# Patient Record
Sex: MALE | Race: WHITE | Employment: FULL TIME | URBAN - METROPOLITAN AREA
[De-identification: names, ages, dates, MRNs, and addresses within clinical notes are randomized per-mention and may not be internally consistent; named-entity substitution may affect disease eponyms.]

---

## 2020-02-06 ENCOUNTER — APPOINTMENT (OUTPATIENT)
Dept: LAB | Age: 25
End: 2020-02-06
Attending: FAMILY MEDICINE
Payer: COMMERCIAL

## 2020-02-06 ENCOUNTER — MED REC SCAN ONLY (OUTPATIENT)
Dept: FAMILY MEDICINE CLINIC | Facility: CLINIC | Age: 25
End: 2020-02-06

## 2020-02-06 ENCOUNTER — OFFICE VISIT (OUTPATIENT)
Dept: FAMILY MEDICINE CLINIC | Facility: CLINIC | Age: 25
End: 2020-02-06
Payer: COMMERCIAL

## 2020-02-06 VITALS
HEART RATE: 86 BPM | HEIGHT: 70.5 IN | WEIGHT: 181 LBS | RESPIRATION RATE: 16 BRPM | DIASTOLIC BLOOD PRESSURE: 74 MMHG | SYSTOLIC BLOOD PRESSURE: 120 MMHG | BODY MASS INDEX: 25.62 KG/M2 | TEMPERATURE: 98 F

## 2020-02-06 DIAGNOSIS — R79.89 TSH ELEVATION: ICD-10-CM

## 2020-02-06 DIAGNOSIS — R14.0 ABDOMINAL BLOATING: ICD-10-CM

## 2020-02-06 DIAGNOSIS — E22.1 HYPERPROLACTINEMIA (HCC): Primary | ICD-10-CM

## 2020-02-06 DIAGNOSIS — N52.9 ERECTILE DYSFUNCTION, UNSPECIFIED ERECTILE DYSFUNCTION TYPE: ICD-10-CM

## 2020-02-06 DIAGNOSIS — E22.1 HYPERPROLACTINEMIA (HCC): ICD-10-CM

## 2020-02-06 LAB
ALBUMIN SERPL-MCNC: 4 G/DL (ref 3.4–5)
ALBUMIN/GLOB SERPL: 1 {RATIO} (ref 1–2)
ALP LIVER SERPL-CCNC: 70 U/L (ref 45–117)
ALT SERPL-CCNC: 48 U/L (ref 16–61)
ANION GAP SERPL CALC-SCNC: 2 MMOL/L (ref 0–18)
AST SERPL-CCNC: 24 U/L (ref 15–37)
BILIRUB SERPL-MCNC: 1.6 MG/DL (ref 0.1–2)
BUN BLD-MCNC: 17 MG/DL (ref 7–18)
BUN/CREAT SERPL: 17.2 (ref 10–20)
CALCIUM BLD-MCNC: 9.4 MG/DL (ref 8.5–10.1)
CHLORIDE SERPL-SCNC: 105 MMOL/L (ref 98–112)
CO2 SERPL-SCNC: 30 MMOL/L (ref 21–32)
CREAT BLD-MCNC: 0.99 MG/DL (ref 0.7–1.3)
ESTRADIOL SERPL-MCNC: 28.7 PG/ML (ref 11–52.5)
GLOBULIN PLAS-MCNC: 4.2 G/DL (ref 2.8–4.4)
GLUCOSE BLD-MCNC: 82 MG/DL (ref 70–99)
LH SERPL-ACNC: 4 MIU/ML (ref 1.2–10.6)
M PROTEIN MFR SERPL ELPH: 8.2 G/DL (ref 6.4–8.2)
OSMOLALITY SERPL CALC.SUM OF ELEC: 285 MOSM/KG (ref 275–295)
PATIENT FASTING Y/N/NP: YES
POTASSIUM SERPL-SCNC: 4.1 MMOL/L (ref 3.5–5.1)
PROLACTIN SERPL-MCNC: 17.3 NG/ML (ref 2.5–17.4)
SODIUM SERPL-SCNC: 137 MMOL/L (ref 136–145)
T4 FREE SERPL-MCNC: 1.2 NG/DL (ref 0.8–1.7)
TESTOST SERPL-MCNC: 670.18 NG/DL
THYROGLOB SERPL-MCNC: 17 U/ML (ref ?–60)
THYROPEROXIDASE AB SERPL-ACNC: <28 U/ML (ref ?–60)
TSI SER-ACNC: 2.73 MIU/ML (ref 0.36–3.74)

## 2020-02-06 PROCEDURE — 82785 ASSAY OF IGE: CPT | Performed by: FAMILY MEDICINE

## 2020-02-06 PROCEDURE — 80053 COMPREHEN METABOLIC PANEL: CPT | Performed by: FAMILY MEDICINE

## 2020-02-06 PROCEDURE — 82670 ASSAY OF TOTAL ESTRADIOL: CPT | Performed by: FAMILY MEDICINE

## 2020-02-06 PROCEDURE — 83002 ASSAY OF GONADOTROPIN (LH): CPT | Performed by: FAMILY MEDICINE

## 2020-02-06 PROCEDURE — 86003 ALLG SPEC IGE CRUDE XTRC EA: CPT | Performed by: FAMILY MEDICINE

## 2020-02-06 PROCEDURE — 84439 ASSAY OF FREE THYROXINE: CPT | Performed by: FAMILY MEDICINE

## 2020-02-06 PROCEDURE — 84403 ASSAY OF TOTAL TESTOSTERONE: CPT | Performed by: FAMILY MEDICINE

## 2020-02-06 PROCEDURE — 84146 ASSAY OF PROLACTIN: CPT | Performed by: FAMILY MEDICINE

## 2020-02-06 PROCEDURE — 86376 MICROSOMAL ANTIBODY EACH: CPT | Performed by: FAMILY MEDICINE

## 2020-02-06 PROCEDURE — 99203 OFFICE O/P NEW LOW 30 MIN: CPT | Performed by: FAMILY MEDICINE

## 2020-02-06 PROCEDURE — 36415 COLL VENOUS BLD VENIPUNCTURE: CPT | Performed by: FAMILY MEDICINE

## 2020-02-06 PROCEDURE — 86800 THYROGLOBULIN ANTIBODY: CPT | Performed by: FAMILY MEDICINE

## 2020-02-06 PROCEDURE — 84443 ASSAY THYROID STIM HORMONE: CPT | Performed by: FAMILY MEDICINE

## 2020-02-06 RX ORDER — SILDENAFIL CITRATE 20 MG/1
20 TABLET ORAL 3 TIMES DAILY
COMMUNITY

## 2020-02-06 NOTE — PROGRESS NOTES
[address] [facility] Office Note  Chief Complaint:   Patient presents with:  Consult: discuss prolactin       HPI:   This is a 30year old male coming in for elevated prolactin levels and abdominal bloating.     1.  Elevated prolactin levels Take 20 mg by mouth 3 (three) times daily. • triamcinolone acetonide 0.1 % External Cream   1      Counseling given: Not Answered       REVIEW OF SYSTEMS:   ROS:  CONSTITUTIONAL:  Denies any unusual weight gain/loss, fever, chills, weakness or fatigue. prolactinoma  - COMP METABOLIC PANEL (14); Future  - PROLACTIN; Future  - LH (LUTEINIZING HORMONE); Future  - ESTRADIOL; Future    2.  Erectile dysfunction, unspecified erectile dysfunction type  -  Could be related to high prolactin levels  -  R/o low test

## 2020-02-10 LAB
CLAM IGE QN: <0.1 KUA/L (ref ?–0.1)
CODFISH IGE QN: <0.1 KUA/L (ref ?–0.1)
CORN IGE QN: <0.1 KUA/L (ref ?–0.1)
COW MILK IGE QN: <0.1 KUA/L (ref ?–0.1)
EGG WHITE IGE QN: <0.1 KUA/L (ref ?–0.1)
IGE SERPL-ACNC: 7.32 KU/L (ref 2–214)
PEANUT IGE QN: <0.1 KUA/L (ref ?–0.1)
SCALLOP IGE QN: <0.1 KUA/L (ref ?–0.1)
SESAME SEED IGE QN: <0.1 KUA/L (ref ?–0.1)
SHRIMP IGE QN: <0.1 KUA/L (ref ?–0.1)
SOYBEAN IGE QN: <0.1 KUA/L (ref ?–0.1)
WALNUT IGE QN: <0.1 KUA/L (ref ?–0.1)
WHEAT IGE QN: <0.1 KUA/L (ref ?–0.1)

## 2020-02-10 NOTE — PROGRESS NOTES
[facility] Office Note  Chief Complaint:   Patient presents with:  Lab Results: 2/6/20      HPI:   This is a 30year old male coming in for anxiety and erectile dysfunction.   Patient states that both these conditions been ongoing discomfort. No palpitations or edema.   Denies any dyspnea on exertion or at rest  RESPIRATORY:  Denies shortness of breath, cough  GASTROINTESTINAL:  Denies any abdominal pain, nausea, vomiting  NEUROLOGICAL:  Denies headache, dizziness, syncope, numbness Sig: Take 1 tablet (150 mg total) by mouth daily. Health Maintenance:  Annual Physical due on 03/22/2019  Influenza Vaccine(1) due on 09/01/2019    Patient/Caregiver Education: Patient/Caregiver Education: There are no barriers to learning.  Medic

## 2020-02-25 NOTE — PROGRESS NOTES
[address] [facility] Office Note  Chief Complaint:   Patient presents with:  Medication Follow-Up: wellbutrin      HPI:   This is a 30year old male coming in for follow-up of anxiety.   Patient was started on Wellbutrin 150 mg extended rele extremities. MUSCULOSKELETAL:  Denies muscle, back pain, joint pain or stiffness.   PSYCHIATRIC:  Denies history of depression, + anxiety    EXAM:   /70 (BP Location: Left arm, Patient Position: Sitting, Cuff Size: adult)   Pulse 84   Temp 98.8 °F (3 complications from the treatments as a result of today.      Problem List:  Patient Active Problem List:     Acne     Eczema      CODIE NDIAYE, DO    Please note that portions of this note may have been completed with a voice recognition program. Effo

## 2020-07-08 ENCOUNTER — TELEPHONE (OUTPATIENT)
Dept: FAMILY MEDICINE CLINIC | Facility: CLINIC | Age: 25
End: 2020-07-08

## 2020-07-08 NOTE — TELEPHONE ENCOUNTER
Patient instructed to monitor for a week and call us if persists or gets bigger. He agrees to do so.

## 2020-07-08 NOTE — TELEPHONE ENCOUNTER
No redness, size of a dime, is tender to touch-lump right under chin on R.next to \"voice box\". Working out, feeling great. Can we peak at this today?

## 2020-07-08 NOTE — TELEPHONE ENCOUNTER
It is likely an enlarged lymph node. If not bothersome, monitor for 7-10 days and if no change, I will see him next week to evaluate.

## 2020-07-08 NOTE — TELEPHONE ENCOUNTER
1. What are your symptoms? A small lump at the right side of pt's neck. 2. How long have you been having these symptoms? 1 day    3. Have you done anything already to treat your symptoms? Warm compress and lymph node massages from [employer].

## 2020-08-04 NOTE — TELEPHONE ENCOUNTER
He is likely fighting an infection which is probably viral.  Monitor symptoms and call on Friday with update.

## 2020-08-04 NOTE — TELEPHONE ENCOUNTER
Pt calling to give update. Pt states the bump has gotten smaller. Pt states he is having muscle aches allover body and has tiredness. Please advise.

## 2020-08-05 NOTE — TELEPHONE ENCOUNTER
Call to pt-advised of dr brown's recommendation noted below. Advised  is working him in-scheduled for 830 am but advised  will call sometime between 7am and noon. Pt confirms he will be in [place].  Travel screen yields positive symptoms pt has report

## 2020-08-05 NOTE — TELEPHONE ENCOUNTER
Pt calling back stating that he noticed last night that he has red spots on the back of his throat and his bones feel achy.

## 2020-08-05 NOTE — TELEPHONE ENCOUNTER
Call to pt-adds has had joint pain since 8/3. sts noticing fatigue, no fever-99.2, but last night developed sl sore throat and noticed red spots in throat. Reports no change in tender lump right front area of neck. Denies any other new symptoms.    Advised

## 2020-08-06 ENCOUNTER — LAB ENCOUNTER (OUTPATIENT)
Dept: LAB | Facility: HOSPITAL | Age: 25
End: 2020-08-06
Attending: FAMILY MEDICINE
Payer: COMMERCIAL

## 2020-08-06 ENCOUNTER — VIRTUAL PHONE E/M (OUTPATIENT)
Dept: FAMILY MEDICINE CLINIC | Facility: CLINIC | Age: 25
End: 2020-08-06
Payer: COMMERCIAL

## 2020-08-06 DIAGNOSIS — R50.9 LOW GRADE FEVER: ICD-10-CM

## 2020-08-06 DIAGNOSIS — J02.9 SORE THROAT: ICD-10-CM

## 2020-08-06 DIAGNOSIS — R52 BODY ACHES: ICD-10-CM

## 2020-08-06 DIAGNOSIS — R52 BODY ACHES: Primary | ICD-10-CM

## 2020-08-06 PROCEDURE — 99441 PHONE E/M BY PHYS 5-10 MIN: CPT | Performed by: FAMILY MEDICINE

## 2020-08-06 RX ORDER — AZITHROMYCIN 250 MG/1
TABLET, FILM COATED ORAL
Qty: 6 TABLET | Refills: 0 | Status: SHIPPED | OUTPATIENT
Start: 2020-08-06 | End: 2020-08-11

## 2020-08-06 NOTE — PROGRESS NOTES
Visit for Respiratory Illness - Potential COVID-19 Infection  Subjective    This visit is conducted using Telemedicine with live, interactive audio.     Chief Complaint:  Concern for respiratory illness (including COVID-19 and influenza)    HPI:   William Varma

## 2020-08-07 LAB — SARS-COV-2 RNA RESP QL NAA+PROBE: NOT DETECTED

## 2020-08-28 ENCOUNTER — HOSPITAL ENCOUNTER (OUTPATIENT)
Age: 25
Discharge: HOME | End: 2020-08-28

## 2020-08-28 VITALS
SYSTOLIC BLOOD PRESSURE: 126 MMHG | TEMPERATURE: 98 F | BODY MASS INDEX: 24 KG/M2 | RESPIRATION RATE: 16 BRPM | HEART RATE: 72 BPM | WEIGHT: 173 LBS | OXYGEN SATURATION: 98 % | DIASTOLIC BLOOD PRESSURE: 82 MMHG

## 2020-08-28 DIAGNOSIS — I88.9 LYMPHADENITIS: Primary | ICD-10-CM

## 2020-08-28 LAB
POCT MONO: NEGATIVE
POCT RAPID STREP: NEGATIVE

## 2020-08-28 PROCEDURE — 99203 OFFICE O/P NEW LOW 30 MIN: CPT | Performed by: NURSE PRACTITIONER

## 2020-08-28 PROCEDURE — 87880 STREP A ASSAY W/OPTIC: CPT | Performed by: NURSE PRACTITIONER

## 2020-08-28 PROCEDURE — 96372 THER/PROPH/DIAG INJ SC/IM: CPT | Performed by: NURSE PRACTITIONER

## 2020-08-28 PROCEDURE — 36415 COLL VENOUS BLD VENIPUNCTURE: CPT | Performed by: NURSE PRACTITIONER

## 2020-08-28 PROCEDURE — 87081 CULTURE SCREEN ONLY: CPT | Performed by: NURSE PRACTITIONER

## 2020-08-28 PROCEDURE — 86308 HETEROPHILE ANTIBODY SCREEN: CPT | Performed by: NURSE PRACTITIONER

## 2020-08-28 RX ORDER — AMOXICILLIN AND CLAVULANATE POTASSIUM 875; 125 MG/1; MG/1
1 TABLET, FILM COATED ORAL 2 TIMES DAILY
Qty: 20 TABLET | Refills: 0 | Status: SHIPPED | OUTPATIENT
Start: 2020-08-28 | End: 2020-09-07

## 2020-08-28 RX ORDER — DEXAMETHASONE SODIUM PHOSPHATE 4 MG/ML
10 VIAL (ML) INJECTION ONCE
Status: COMPLETED | OUTPATIENT
Start: 2020-08-28 | End: 2020-08-28

## 2020-08-28 NOTE — ED PROVIDER NOTES
Patient Seen in: [facility]      History   Patient presents with:   Other    Stated Complaint: DISCOMFORT IN THE trachea (BOTH SIDES) AREA    HPI    This is a pleasant 75-year-old male who presents with 6 weeks of intermitte Wt 78.5 kg   SpO2 98%   BMI 24.47 kg/m²         Physical Exam  Vitals signs and nursing note reviewed. Constitutional:       General: He is not in acute distress. Appearance: He is not toxic-appearing.    HENT:      Right Ear: Tympanic membrane norm mg Intramuscular Given 8/28/20 [id number])       Rapid strep is negative, we will send out for culture. Monospot was negative. MDM     Patient is afebrile and well-appearing. Tolerating p.o. fluids.   With ongoing symptoms and multiple enlarged lymph nodes,

## 2020-08-28 NOTE — ED INITIAL ASSESSMENT (HPI)
Pt c/o trachea discomfort x 1 week. Also had onset swelling of lymph node to posterior right neck, onset x 6 weeks that has now decreased in size. Pt was put on z-pack x 4 weeks ago and completed.  Pt denies fever, chills, sob, chest pain, Pt has had some b

## 2020-08-31 ENCOUNTER — VIRTUAL PHONE E/M (OUTPATIENT)
Dept: FAMILY MEDICINE CLINIC | Facility: CLINIC | Age: 25
End: 2020-08-31
Payer: COMMERCIAL

## 2020-08-31 ENCOUNTER — TELEPHONE (OUTPATIENT)
Dept: FAMILY MEDICINE CLINIC | Facility: CLINIC | Age: 25
End: 2020-08-31

## 2020-08-31 DIAGNOSIS — I88.9 LYMPHADENITIS: Primary | ICD-10-CM

## 2020-08-31 PROCEDURE — 99214 OFFICE O/P EST MOD 30 MIN: CPT | Performed by: FAMILY MEDICINE

## 2020-08-31 RX ORDER — PREDNISONE 10 MG/1
TABLET ORAL
Qty: 30 TABLET | Refills: 0 | Status: SHIPPED | OUTPATIENT
Start: 2020-08-31 | End: 2020-12-31 | Stop reason: ALTCHOICE

## 2020-08-31 NOTE — PROGRESS NOTES
Virtual/Telephone Check-In    Jamarcus Lopez verbally consents to a Virtual/Telephone Check-In service on 08/31/20. Patient understands and accepts financial responsibility for any deductible, co-insurance and/or co-pays associated with this service.  This vi 30 tablet;  Refill: 0  -  Persistent  -  Finish 10 day course of augmentin  -  Start prednisone taper  -  Will refer to ENT and obtain CT soft tissue of neck if symptoms persist    Follow up: as needed  Duration of service, in minutes: 6 min  Patient also a

## 2020-08-31 NOTE — TELEPHONE ENCOUNTER
Joseph Salazar N/PSR calling pt to schedule as noted below. Scheduled in 415 slot but advised dr will call some time this afternoon.

## 2020-09-04 ENCOUNTER — TELEPHONE (OUTPATIENT)
Dept: FAMILY MEDICINE CLINIC | Facility: CLINIC | Age: 25
End: 2020-09-04

## 2020-09-04 NOTE — TELEPHONE ENCOUNTER
Patient was told to call in today with how things are going. States everything is still persisting. His voice starts out ok in the morning and then as he he speaking throughout the day his voice becomes raspy/hoarse.  Patient states he is ready to take th

## 2020-09-04 NOTE — TELEPHONE ENCOUNTER
Attempted to call pt. Left detailed message on identifiable VM that I will be sending the recommendation to his mychart.

## 2020-09-09 PROBLEM — R49.0 MUSCLE TENSION DYSPHONIA: Status: ACTIVE | Noted: 2020-09-09

## 2020-09-09 PROBLEM — R49.0 HOARSENESS: Status: ACTIVE | Noted: 2020-09-09

## 2020-12-30 ENCOUNTER — TELEPHONE (OUTPATIENT)
Dept: FAMILY MEDICINE CLINIC | Facility: CLINIC | Age: 25
End: 2020-12-30

## 2020-12-30 NOTE — TELEPHONE ENCOUNTER
S/w pt. Reports intermittent RLQ and Left side flank pain x 3 weeks. Notes he feels it more when he drinks caffeine. Also feels pain in testicle on occasion. No swelling. Wonders if kidney stones.  I explained anatomically kidney stones can not reach testi

## 2020-12-30 NOTE — TELEPHONE ENCOUNTER
1. What are your symptoms?    r side abd pain back pain testicle pain     2. How long have you been having these symptoms? 3 weeks     3. Have you done anything already to treat your symptoms?          ADDITIONAL INFO:

## 2020-12-31 ENCOUNTER — OFFICE VISIT (OUTPATIENT)
Dept: FAMILY MEDICINE CLINIC | Facility: CLINIC | Age: 25
End: 2020-12-31
Payer: COMMERCIAL

## 2020-12-31 VITALS
HEIGHT: 70 IN | SYSTOLIC BLOOD PRESSURE: 126 MMHG | BODY MASS INDEX: 26.05 KG/M2 | RESPIRATION RATE: 18 BRPM | DIASTOLIC BLOOD PRESSURE: 66 MMHG | HEART RATE: 80 BPM | WEIGHT: 182 LBS

## 2020-12-31 DIAGNOSIS — R10.9 RIGHT FLANK PAIN: Primary | ICD-10-CM

## 2020-12-31 PROCEDURE — 3074F SYST BP LT 130 MM HG: CPT | Performed by: FAMILY MEDICINE

## 2020-12-31 PROCEDURE — 3078F DIAST BP <80 MM HG: CPT | Performed by: FAMILY MEDICINE

## 2020-12-31 PROCEDURE — 81003 URINALYSIS AUTO W/O SCOPE: CPT | Performed by: FAMILY MEDICINE

## 2020-12-31 PROCEDURE — 3008F BODY MASS INDEX DOCD: CPT | Performed by: FAMILY MEDICINE

## 2020-12-31 PROCEDURE — 99214 OFFICE O/P EST MOD 30 MIN: CPT | Performed by: FAMILY MEDICINE

## 2020-12-31 NOTE — PROGRESS NOTES
[facility] Family Medicine Office Note  Chief Complaint:   Patient presents with:  Abdominal Pain: abd x 2-3 weeks  testicular pain started this morning      HPI:   This is a 30year old male coming in for RUQ abdominal pain and right flank pain. upper abdominal pain    EXAM:   /66 (BP Location: Right arm, Patient Position: Sitting, Cuff Size: adult)   Pulse 80   Resp 18   Ht 5' 10\" (1.778 m)   Wt 182 lb (82.6 kg)   BMI 26.11 kg/m²  Estimated body mass index is 26.11 kg/m² as calculated from Active Problem List:     Acne     Eczema     Muscle tension dysphonia     Hoarseness      CODIE NDIAYE, DO    Please note that portions of this note may have been completed with a voice recognition program. Efforts were made to edit the dictations bu

## 2021-01-04 ENCOUNTER — TELEPHONE (OUTPATIENT)
Dept: FAMILY MEDICINE CLINIC | Facility: CLINIC | Age: 26
End: 2021-01-04

## 2021-01-04 ENCOUNTER — HOSPITAL ENCOUNTER (OUTPATIENT)
Age: 26
Discharge: HOME | End: 2021-01-04
Payer: COMMERCIAL

## 2021-01-04 ENCOUNTER — HOSPITAL ENCOUNTER (OUTPATIENT)
Dept: CT IMAGING | Age: 26
Discharge: HOME | End: 2021-01-04
Attending: FAMILY MEDICINE
Payer: COMMERCIAL

## 2021-01-04 VITALS
RESPIRATION RATE: 12 BRPM | OXYGEN SATURATION: 98 % | SYSTOLIC BLOOD PRESSURE: 132 MMHG | DIASTOLIC BLOOD PRESSURE: 87 MMHG | HEIGHT: 70 IN | WEIGHT: 178 LBS | HEART RATE: 86 BPM | TEMPERATURE: 99 F | BODY MASS INDEX: 25.48 KG/M2

## 2021-01-04 DIAGNOSIS — R10.9 RIGHT FLANK PAIN: ICD-10-CM

## 2021-01-04 DIAGNOSIS — R10.9 ABDOMINAL PAIN, RIGHT LATERAL: Primary | ICD-10-CM

## 2021-01-04 LAB
POCT BILIRUBIN URINE: NEGATIVE
POCT BLOOD URINE: NEGATIVE
POCT GLUCOSE URINE: NEGATIVE MG/DL
POCT KETONE URINE: NEGATIVE MG/DL
POCT LEUKOCYTE ESTERASE URINE: NEGATIVE
POCT NITRITE URINE: NEGATIVE
POCT PH URINE: 6.5 (ref 5–8)
POCT PROTEIN URINE: NEGATIVE MG/DL
POCT SPECIFIC GRAVITY URINE: 1.01
POCT URINE CLARITY: CLEAR
POCT URINE COLOR: YELLOW
POCT UROBILINOGEN URINE: 0.2 MG/DL

## 2021-01-04 PROCEDURE — 99212 OFFICE O/P EST SF 10 MIN: CPT | Performed by: NURSE PRACTITIONER

## 2021-01-04 PROCEDURE — 81002 URINALYSIS NONAUTO W/O SCOPE: CPT | Performed by: NURSE PRACTITIONER

## 2021-01-04 PROCEDURE — 74176 CT ABD & PELVIS W/O CONTRAST: CPT | Performed by: FAMILY MEDICINE

## 2021-01-04 NOTE — TELEPHONE ENCOUNTER
1. What are your symptoms? BLACK COFFEE  GRIND SPOTS IN STOOL    2. How long have you been having these symptoms? LAST NIGHT    3. Have you done anything already to treat your symptoms?          ADDITIONAL INFO:

## 2021-01-04 NOTE — TELEPHONE ENCOUNTER
Seen 12/31     RUQ-for weeks on and off   Today R middle side, last happened 30 mins ago   Quik \"jab\" \"sharp\" then comes back randomly 4-5/10 pain scale   No N/V   Soft stool   Stomach feels soft as reported  Last BM 3 mins. soft  Noted black specks 3X

## 2021-01-04 NOTE — ED PROVIDER NOTES
Patient Seen in: Immediate [address]      History   Patient presents with:  Nausea/Vomiting/Diarrhea    Stated Complaint: stomach pain    HPI/Subjective:   22-year-old male presents to immediate care with right sided abdominal pain.   Patient Tenderness: There is no abdominal tenderness. There is no right CVA tenderness, left CVA tenderness, guarding or rebound. Negative signs include Ramos's sign. Musculoskeletal: Normal range of motion. Skin:     General: Skin is warm and dry.

## 2021-01-04 NOTE — TELEPHONE ENCOUNTER
Referral called  CT authorized now  Go ahead with this? Or UC/ER, or only if worsening/active bleeding/ solid black stool?   Reported black specks of black stool X3 since last night    Pls advise  Thank you

## 2021-01-04 NOTE — TELEPHONE ENCOUNTER
Pt has a CT scan ordered to look for a kidney stone. Pt called his insurance to see if it was approved and insurance stated that if our office called the insurance at [phone], the scan would get approved faster. Please advise.

## 2021-01-04 NOTE — ED INITIAL ASSESSMENT (HPI)
C/o intermittent bilateral upper abdominal pain today. Also noted small black specs in his stool, which he reports as lighter and softer than normal.  Does have a hx of kidney stones.   Was awaiting a CT from Dr. Colby Field office, but is having insurance is

## 2021-01-07 ENCOUNTER — OFFICE VISIT (OUTPATIENT)
Dept: FAMILY MEDICINE CLINIC | Facility: CLINIC | Age: 26
End: 2021-01-07
Payer: COMMERCIAL

## 2021-01-07 ENCOUNTER — HOSPITAL ENCOUNTER (OUTPATIENT)
Dept: ULTRASOUND IMAGING | Age: 26
Discharge: HOME | End: 2021-01-07
Attending: FAMILY MEDICINE
Payer: COMMERCIAL

## 2021-01-07 ENCOUNTER — TELEPHONE (OUTPATIENT)
Dept: FAMILY MEDICINE CLINIC | Facility: CLINIC | Age: 26
End: 2021-01-07

## 2021-01-07 VITALS
SYSTOLIC BLOOD PRESSURE: 124 MMHG | RESPIRATION RATE: 18 BRPM | WEIGHT: 178 LBS | DIASTOLIC BLOOD PRESSURE: 80 MMHG | HEIGHT: 70 IN | BODY MASS INDEX: 25.48 KG/M2 | HEART RATE: 80 BPM

## 2021-01-07 DIAGNOSIS — N20.0 KIDNEY STONE: ICD-10-CM

## 2021-01-07 DIAGNOSIS — R10.11 RUQ PAIN: Primary | ICD-10-CM

## 2021-01-07 DIAGNOSIS — R10.11 RUQ PAIN: ICD-10-CM

## 2021-01-07 PROCEDURE — 99214 OFFICE O/P EST MOD 30 MIN: CPT | Performed by: FAMILY MEDICINE

## 2021-01-07 PROCEDURE — 76700 US EXAM ABDOM COMPLETE: CPT | Performed by: FAMILY MEDICINE

## 2021-01-07 PROCEDURE — 3074F SYST BP LT 130 MM HG: CPT | Performed by: FAMILY MEDICINE

## 2021-01-07 PROCEDURE — 3008F BODY MASS INDEX DOCD: CPT | Performed by: FAMILY MEDICINE

## 2021-01-07 PROCEDURE — 3079F DIAST BP 80-89 MM HG: CPT | Performed by: FAMILY MEDICINE

## 2021-01-07 RX ORDER — ETODOLAC 400 MG/1
400 TABLET, FILM COATED ORAL 2 TIMES DAILY
Qty: 28 TABLET | Refills: 0 | Status: SHIPPED | OUTPATIENT
Start: 2021-01-07 | End: 2021-01-21

## 2021-01-07 NOTE — TELEPHONE ENCOUNTER
FYI: Patient called and states that the soonest he can get in to have his Ultrasound that Dr. Lalit Parekh ordered is 13 days from now. Patient states he is in a lot of pain. Please advise. Verified cell number on file.

## 2021-01-07 NOTE — PROGRESS NOTES
[facility] Family [facility] Office Note  Chief Complaint:   Patient presents with:  Flank Pain: right      HPI:   This is a 30year old male coming in for continued right upper quadrant and flank pain.   Patient has CT of the abdomen done recently fatigue. CARDIOVASCULAR:  Denies chest pain, chest pressure or chest discomfort. No palpitations or edema.   Denies any dyspnea on exertion or at rest  RESPIRATORY:  Denies shortness of breath, cough  GASTROINTESTINAL:  + RUQ abdominal pain, denies nausea, times daily for 14 days. Dispense: 28 tablet; Refill: 0    2.  Kidney stone  -  See above  - UROLOGY - INTERNAL      Meds & Refills for this Visit:  Requested Prescriptions     Signed Prescriptions Disp Refills   • Etodolac 400 MG Oral Tab 28 tablet 0

## 2021-01-15 ENCOUNTER — TELEPHONE (OUTPATIENT)
Dept: SURGERY | Facility: CLINIC | Age: 26
End: 2021-01-15

## 2021-01-15 NOTE — TELEPHONE ENCOUNTER
Called and left message to see if pt would be able to change his appt from 1- at 12:30pm to 10:00am. If he is able to move please block the 12:30 spot, thank you

## 2021-01-19 ENCOUNTER — OFFICE VISIT (OUTPATIENT)
Dept: SURGERY | Facility: CLINIC | Age: 26
End: 2021-01-19
Payer: COMMERCIAL

## 2021-01-19 VITALS — SYSTOLIC BLOOD PRESSURE: 110 MMHG | TEMPERATURE: 98 F | DIASTOLIC BLOOD PRESSURE: 75 MMHG | HEART RATE: 76 BPM

## 2021-01-19 DIAGNOSIS — R82.90 URINE FINDING: Primary | ICD-10-CM

## 2021-01-19 DIAGNOSIS — N20.0 KIDNEY STONES: ICD-10-CM

## 2021-01-19 LAB
APPEARANCE: CLEAR
MULTISTIX LOT#: 5077 NUMERIC
PH, URINE: 5.5 (ref 4.5–8)
SPECIFIC GRAVITY: 1.02 (ref 1–1.03)
URINE-COLOR: YELLOW
UROBILINOGEN,SEMI-QN: 0.2 MG/DL (ref 0–1.9)

## 2021-01-19 PROCEDURE — 3078F DIAST BP <80 MM HG: CPT | Performed by: UROLOGY

## 2021-01-19 PROCEDURE — 81003 URINALYSIS AUTO W/O SCOPE: CPT | Performed by: UROLOGY

## 2021-01-19 PROCEDURE — 99203 OFFICE O/P NEW LOW 30 MIN: CPT | Performed by: UROLOGY

## 2021-01-19 PROCEDURE — 3074F SYST BP LT 130 MM HG: CPT | Performed by: UROLOGY

## 2021-01-19 NOTE — PROGRESS NOTES
Atiya Velasquez is a 30year old male. No chief complaint on file.   Kidney Stones / Ureteral Stone  Pain: Yes right  Nausea: No    Vomiting: No    Previous Stones: Yes: 2 years ago, family hx of stones    Chemical Analysis: no  Medical Evaluation: CT Scan an INDICATIONS:  R10.9 Right flank pain     TECHNIQUE:  Unenhanced multislice CT scanning from above the kidneys to below the urinary bladder. 2D rendering are generated on the CT scanner workstation to localize potential stones in the cranio-caudal plane. Previous Medications for Stones: none      HPI:     Patient comes to the office with a history of bilateral small kidney stones. First kidney stone was about 2 years ago which was 2 mm he believes that he passed it.   Most recent CT scan shows severa lesions  HEENT: atraumatic, normocephalic,ears and throat are clear  NECK: supple  LUNGS: normal respiratory motion without distress  CARDIO: normal peripheral perfusion  ABDOMEN: no masses,  tenderness, or hernia  BACK: no CVA tenderness  NEURO: grossly n indicates understanding of these issues and agrees to the plan. Vasu Ceron M.D.   Urology

## 2021-02-14 ENCOUNTER — LAB ENCOUNTER (OUTPATIENT)
Dept: LAB | Facility: HOSPITAL | Age: 26
End: 2021-02-14
Attending: UROLOGY
Payer: COMMERCIAL

## 2021-02-14 DIAGNOSIS — R82.90 URINE FINDING: ICD-10-CM

## 2021-02-14 PROCEDURE — 82436 ASSAY OF URINE CHLORIDE: CPT

## 2021-02-14 PROCEDURE — 84560 ASSAY OF URINE/URIC ACID: CPT

## 2021-02-14 PROCEDURE — 84133 ASSAY OF URINE POTASSIUM: CPT

## 2021-02-14 PROCEDURE — 83986 ASSAY PH BODY FLUID NOS: CPT

## 2021-02-14 PROCEDURE — 82340 ASSAY OF CALCIUM IN URINE: CPT

## 2021-02-14 PROCEDURE — 82507 ASSAY OF CITRATE: CPT

## 2021-02-14 PROCEDURE — 84300 ASSAY OF URINE SODIUM: CPT

## 2021-02-14 PROCEDURE — 83945 ASSAY OF OXALATE: CPT

## 2021-02-14 PROCEDURE — 84105 ASSAY OF URINE PHOSPHORUS: CPT

## 2021-02-14 PROCEDURE — 83735 ASSAY OF MAGNESIUM: CPT

## 2021-02-19 LAB
CALCIUM URINE - PER 24H: 227 MG/D
CALCIUM URINE - PER VOL: 8.9 MG/DL
CHLORIDE URINE - PER 24H: 140 MMOL/D
CHLORIDE URINE - PER VOL.: 55 MMOL/L
CITRIC ACID, URINE - MG/DAY: 214 MG/D
CITRIC ACID, URINE - MG/L: 84 MG/L
CREATININE, URINE - PER 24H: 1938 MG/D
CREATININE, URINE - PER VOLUME: 76 MG/DL
HOURS COLLECTED: 24 HR
MAGNESIUM URINE - PER 24H: 87 MG/D
MAGNESIUM URINE - PER VOL: 3.4 MG/DL
OXALATE, URINE - MG/DAY: 33 MG/D
OXALATE, URINE - MG/L: 13 MG/L
PH, URINE: 6
PHOSPHORUS URINE - PER 24H: 1096 MG/D
PHOSPHORUS URINE - PER VOL: 43 MG/DL
POTASSIUM URINE - PER 24H: 46 MMOL/D
POTASSIUM URINE - PER VOL.: 18 MMOL/L
SODIUM URINE - PER VOL.: 66 MMOL/L
SODIUM URINE -PER 24H: 168 MMOL/D
TOTAL VOLUME: 2550 ML
URIC ACID URINE - PER 24H: 617 MG/D
URIC ACID URINE - PER VOL: 24.2 MG/DL

## 2025-05-24 ENCOUNTER — WALK IN (OUTPATIENT)
Dept: URGENT CARE | Age: 30
End: 2025-05-24
Attending: FAMILY MEDICINE

## 2025-05-24 VITALS
RESPIRATION RATE: 20 BRPM | HEART RATE: 74 BPM | DIASTOLIC BLOOD PRESSURE: 73 MMHG | OXYGEN SATURATION: 98 % | TEMPERATURE: 98.4 F | SYSTOLIC BLOOD PRESSURE: 124 MMHG

## 2025-05-24 DIAGNOSIS — H01.001 BLEPHARITIS OF RIGHT UPPER EYELID, UNSPECIFIED TYPE: Primary | ICD-10-CM

## 2025-05-24 RX ORDER — ERYTHROMYCIN 5 MG/G
OINTMENT OPHTHALMIC EVERY 6 HOURS
Qty: 1 PACKET | Refills: 0 | Status: SHIPPED | OUTPATIENT
Start: 2025-05-24 | End: 2025-05-31

## 2025-05-24 RX ORDER — TOBRAMYCIN 3 MG/ML
1 SOLUTION/ DROPS OPHTHALMIC EVERY 4 HOURS
Qty: 5 ML | Refills: 0 | Status: SHIPPED | OUTPATIENT
Start: 2025-05-24 | End: 2025-05-31

## 2025-05-24 ASSESSMENT — ENCOUNTER SYMPTOMS
EYE DISCHARGE: 1
EYE REDNESS: 1